# Patient Record
(demographics unavailable — no encounter records)

---

## 2024-12-17 NOTE — RISK ASSESSMENT
[Eats meals with family] : eats meals with family [Has family members/adults to turn to for help] : has family members/adults to turn to for help [Is permitted and is able to make independent decisions] : Is permitted and is able to make independent decisions [Grade: ____] : Grade: [unfilled] [Eats regular meals including adequate fruits and vegetables] : eats regular meals including adequate fruits and vegetables [Drinks non-sweetened liquids] : drinks non-sweetened liquids  [Calcium source] : calcium source [Has friends] : has friends [At least 1 hour of physical activity a day] : at least 1 hour of physical activity a day [Has interests/participates in community activities/volunteers] : has interests/participates in community activities/volunteers [Uses drugs] : uses drugs  [Home is free of violence] : home is free of violence [Has peer relationships free of violence] : has peer relationships free of violence [Uses safety belts/safety equipment] : uses safety belts/safety equipment  [Has/had oral sex] : has/had oral sex [Has had sexual intercourse] : has had sexual intercourse [Vaginal] : vaginal [Has ways to cope with stress] : has ways to cope with stress [Displays self-confidence] : displays self-confidence [With Teen] : teen [Has concerns about body or appearance] : does not have concerns about body or appearance [Screen time (except homework) less than 2 hours a day] : no screen time (except homework) less than 2 hours a day [Uses tobacco] : does not use tobacco [Drinks alcohol] : does not drink alcohol [Impaired/distracted driving] : no impaired/distracted driving [Has problems with sleep] : does not have problems with sleep [Gets depressed, anxious, or irritable/has mood swings] : does not get depressed, anxious, or irritable/has mood swings [Has thought about hurting self or considered suicide] : has not thought about hurting self or considered suicide [de-identified] : Lives with mom, sister [de-identified] : [employer]; plans to graduate in June 2024, plans to go to college or  [de-identified] : Enjoys reading comics, watching tv, playing games [de-identified] : use of weed monthly [de-identified] : cope with stress by watching tv, playing games, being alone

## 2024-12-17 NOTE — RISK ASSESSMENT
[Eats meals with family] : eats meals with family [Has family members/adults to turn to for help] : has family members/adults to turn to for help [Is permitted and is able to make independent decisions] : Is permitted and is able to make independent decisions [Grade: ____] : Grade: [unfilled] [Eats regular meals including adequate fruits and vegetables] : eats regular meals including adequate fruits and vegetables [Drinks non-sweetened liquids] : drinks non-sweetened liquids  [Calcium source] : calcium source [Has friends] : has friends [At least 1 hour of physical activity a day] : at least 1 hour of physical activity a day [Has interests/participates in community activities/volunteers] : has interests/participates in community activities/volunteers [Uses drugs] : uses drugs  [Home is free of violence] : home is free of violence [Has peer relationships free of violence] : has peer relationships free of violence [Uses safety belts/safety equipment] : uses safety belts/safety equipment  [Has/had oral sex] : has/had oral sex [Has had sexual intercourse] : has had sexual intercourse [Vaginal] : vaginal [Has ways to cope with stress] : has ways to cope with stress [Displays self-confidence] : displays self-confidence [With Teen] : teen [Has concerns about body or appearance] : does not have concerns about body or appearance [Screen time (except homework) less than 2 hours a day] : no screen time (except homework) less than 2 hours a day [Uses tobacco] : does not use tobacco [Drinks alcohol] : does not drink alcohol [Impaired/distracted driving] : no impaired/distracted driving [Has problems with sleep] : does not have problems with sleep [Gets depressed, anxious, or irritable/has mood swings] : does not get depressed, anxious, or irritable/has mood swings [Has thought about hurting self or considered suicide] : has not thought about hurting self or considered suicide [de-identified] : Lives with mom, sister [de-identified] : [employer]; plans to graduate in June 2024, plans to go to college or  [de-identified] : Enjoys reading comics, watching tv, playing games [de-identified] : use of weed monthly [de-identified] : cope with stress by watching tv, playing games, being alone

## 2024-12-17 NOTE — DISCUSSION/SUMMARY
[FreeTextEntry1] : 18 year old male presents to clinic for Plan B. 1. EC -Reviewed how EC works, the benefits, and common side effects that people experience after taking EC. EC consent signed and on chart.   -Dispensed Levonorgestrel 1.5mg disp 1 tablet PO for patient's partner to take as soon as possible; Advised patient if partner vomits within 3 hours of taking medication- needs to return to clinic to repeat dose;  -Instructed patient to abstain from sex x 1 week. -Counseled regarding STI prevention and condom use always. Dispensed condoms today. -If partner's menses does not occur within 3 weeks she will need to take a pregnancy test  2. Delayed immunizations -Copy of CIR, VIS, and vaccine consent form provided to patient for parent to review, sign, and return for administration. -MMRV IgG and Hepatitis B surface antibody sent to lab to determine patient's immunity status. -Appointment scheduled.  3. STI/HIV screening -GC/Chlamydia and HIV screening sent to lab, will notify pt for abnormal results. -Encouraged patient to limit number of sex partners and to consistently use condoms for all sex encounters to prevent STI/HIV and pregnancy. Dispensed condom supply today.  3.  -[facility] performed and reviewed with patient. No positive indicators noted. Anticipatory guidance provided.  RTC as needed.

## 2024-12-17 NOTE — HISTORY OF PRESENT ILLNESS
[FreeTextEntry6] : 18 year old male presents to clinic requesting Plan B. Unprotected sex encounter: 3 days ago Last sex without condom before most recent encounter: 2 weeks prior to this last encounter Condom use ever: sometimes States he ran out of condoms Current partner: 1 female; partner age: 17y.o. Partner is not on BC method, she does not go to school here [place] denies pulling out prior to ejaculation  Type of sex: vaginal, oral HIV/STI screening: Never Has had 6 sexual partners in his lifetime  Pt denies urogenital symptoms at present and illness at present

## 2024-12-17 NOTE — DISCUSSION/SUMMARY
[FreeTextEntry1] : 18 year old male presents to clinic for Plan B. 1. EC -Reviewed how EC works, the benefits, and common side effects that people experience after taking EC. EC consent signed and on chart.   -Dispensed Levonorgestrel 1.5mg disp 1 tablet PO for patient's partner to take as soon as possible; Advised patient if partner vomits within 3 hours of taking medication- needs to return to clinic to repeat dose;  -Instructed patient to abstain from sex x 1 week. -Counseled regarding STI prevention and condom use always. Dispensed condoms today. -If partner's menses does not occur within 3 weeks she will need to take a pregnancy test  2. Delayed immunizations -Copy of CIR, VIS, and vaccine consent form provided to patient for parent to review, sign, and return for administration. -MMRV IgG and Hepatitis B surface antibody sent to lab to determine patient's immunity status. -Appointment scheduled.  3. STI/HIV screening -GC/Chlamydia and HIV screening sent to lab, will notify pt for abnormal results. -Encouraged patient to limit number of sex partners and to consistently use condoms for all sex encounters to prevent STI/HIV and pregnancy. Dispensed condom supply today.  3.  -[facility] performed and reviewed with patient. No positive indicators noted. Anticipatory guidance provided.  RTC as needed.

## 2025-05-14 NOTE — PHYSICAL EXAM
[NL] : no acute distress, alert [Laceration] : laceration [Ecchymosis] : no ecchymosis [Swelling] : swelling [FreeTextEntry2] : 0.5cm laceration to middle of forehead, bleeding, well approximated

## 2025-05-14 NOTE — HISTORY OF PRESENT ILLNESS
[FreeTextEntry6] : 18 year old male presents to clinic with head injury and laceration after colliding with student states he was playing basketball when he collided with another student denies LOC, dizziness, blurred vision or confusion active bleeding to forehead

## 2025-05-14 NOTE — DISCUSSION/SUMMARY
[FreeTextEntry1] : 18 year old male presents to clinic with forehead laceration site cleansed and bleeding stopped dermabond applied to forehead laceration, steri strips and telfa pad applied CIR reviewed - needs Tdap vaccine, no consent on file. VIS and consent sent home - advised student to return to clinic tomorrow for vaccine to prevent tetanus. Neuro exam WNL discussed signs and symptoms that would need to be evaluated in ER - confusion, dizziness, extreme fatigue, headache, blurred vision  return to clinic as needed

## 2025-05-14 NOTE — REVIEW OF SYSTEMS
[Headache] : no headache [Weakness] : no weakness [Lightheadness] : no lightheadness [Dizziness] : no dizziness [Laceration] : laceration [Negative] : Constitutional

## 2025-05-15 NOTE — HISTORY OF PRESENT ILLNESS
[FreeTextEntry6] : 18 year old male presents to clinic for follow up of forehead laceration denies any pain at site denies any LOC, confusion, blurred vision, extreme fatigue or headache. bandage remains intact on forehead brought childhood vaccine record

## 2025-05-15 NOTE — DISCUSSION/SUMMARY
[FreeTextEntry1] : 18 year old male presents to clinic for follow up of forehead laceration Amoxicillin and clavulanate 875mg/125mg tablets x 6 dispensed. take 1 tablet by mouth twice a day for 3 days - given as prophylaxis for infection CIR reviewed - UTD on vaccines  return to clinic on Monday for follow up of forehead laceration